# Patient Record
Sex: FEMALE | Race: ASIAN | URBAN - METROPOLITAN AREA
[De-identification: names, ages, dates, MRNs, and addresses within clinical notes are randomized per-mention and may not be internally consistent; named-entity substitution may affect disease eponyms.]

---

## 2021-04-28 ENCOUNTER — OFFICE VISIT (OUTPATIENT)
Dept: URBAN - METROPOLITAN AREA CLINIC 115 | Facility: CLINIC | Age: 31
End: 2021-04-28
Payer: COMMERCIAL

## 2021-04-28 ENCOUNTER — DASHBOARD ENCOUNTERS (OUTPATIENT)
Age: 31
End: 2021-04-28

## 2021-04-28 ENCOUNTER — WEB ENCOUNTER (OUTPATIENT)
Dept: URBAN - METROPOLITAN AREA CLINIC 115 | Facility: CLINIC | Age: 31
End: 2021-04-28

## 2021-04-28 DIAGNOSIS — K58.9 IRRITABLE BOWEL SYNDROME, UNSPECIFIED TYPE: ICD-10-CM

## 2021-04-28 DIAGNOSIS — R10.13 DYSPEPSIA: ICD-10-CM

## 2021-04-28 DIAGNOSIS — R20.2 COMPLAINT OF PARESTHESIA: ICD-10-CM

## 2021-04-28 DIAGNOSIS — R11.0 NAUSEA: ICD-10-CM

## 2021-04-28 PROBLEM — 10743008: Status: ACTIVE | Noted: 2021-04-28

## 2021-04-28 PROBLEM — 422587007: Status: ACTIVE | Noted: 2021-04-28

## 2021-04-28 PROBLEM — 162031009: Status: ACTIVE | Noted: 2021-04-28

## 2021-04-28 PROBLEM — 162246009: Status: ACTIVE | Noted: 2021-04-28

## 2021-04-28 PROCEDURE — 99244 OFF/OP CNSLTJ NEW/EST MOD 40: CPT | Performed by: INTERNAL MEDICINE

## 2021-04-28 PROCEDURE — 91065 BREATH HYDROGEN/METHANE TEST: CPT | Performed by: INTERNAL MEDICINE

## 2021-04-28 RX ORDER — MAGNESIUM OXIDE/MAG AA CHELATE 300 MG
1 CAPSULE WITH A MEAL CAPSULE ORAL ONCE A DAY
Status: ACTIVE | COMMUNITY

## 2021-04-28 RX ORDER — CHOLECALCIFEROL (VITAMIN D3) 50 MCG
1 TABLET TABLET ORAL ONCE A DAY
Status: ACTIVE | COMMUNITY

## 2021-04-28 NOTE — HPI-TODAY'S VISIT:
This patient was referred by Dr. Catie FISH, Constantin Cruzfor evaluation of  abdominal bloating . The copy of this note will be sent to the referring provider.

## 2021-04-30 LAB
DEAMIDATED GLIADIN ABS, IGA: 6
DEAMIDATED GLIADIN ABS, IGG: 3
ENDOMYSIAL ANTIBODY IGA: NEGATIVE
IMMUNOGLOBULIN A, QN, SERUM: 198
T-TRANSGLUTAMINASE (TTG) IGA: <2
T-TRANSGLUTAMINASE (TTG) IGG: <2

## 2023-09-01 NOTE — HPI-TODAY'S VISIT:
31-year-old female was seen today for the complaints of having abdominal bloating discomfort as well as numbness in her lower extremities as well as in her hands.  Patient stated she has been having the symptoms as she gets bloating symptoms.  She was diagnosed to have a IBS in the past.  She also admits to having anxiety and she takes multiple medications.  She denies having these episodes of tingling and numbness as panic attacks.  She was concerned her that her symptoms are being brushed off.  Denies any unintentional weight loss.  She also noticed that after eating immediately after eating she gets and abdominal distention.  She had we went over the pictures of her abdomen which is distended.  She thinks all her problems are since her symptoms are related to the prediabetes.  She thinks blood sugar level raises her tingling and numbness gets worse.  Her hemoglobin A1c is 5.7.  She is currently not on any medications.  B12 and rest of the labs are all reviewed that were done from recent [facility] physical are all unremarkable.  Patient reports some of the abdominal distention bloating improves after she has a bowel movement and also it improves on its own.  She has had the symptoms for more than a year.  She denies rectal bleeding she denies any melanotic stools.  She denies any nausea vomiting.  I called and spoke with pt. The paperwork the [facility] sent over was just requesting medical records there was no form. I told him I even called them to confirm that is all they need. Pt voiced understanding. He stated his CT is scheduled for 9/7. We will call him next week to schedule a follow up with Dr. Marly Wood. Pt understood and thanked me for calling.